# Patient Record
Sex: FEMALE | Race: WHITE | Employment: FULL TIME | URBAN - METROPOLITAN AREA
[De-identification: names, ages, dates, MRNs, and addresses within clinical notes are randomized per-mention and may not be internally consistent; named-entity substitution may affect disease eponyms.]

---

## 2019-02-26 ENCOUNTER — ANESTHESIA EVENT (OUTPATIENT)
Dept: OPERATING ROOM | Age: 49
End: 2019-02-26
Payer: COMMERCIAL

## 2019-02-26 ENCOUNTER — ANESTHESIA (OUTPATIENT)
Dept: OPERATING ROOM | Age: 49
End: 2019-02-26
Payer: COMMERCIAL

## 2019-02-26 ENCOUNTER — HOSPITAL ENCOUNTER (OUTPATIENT)
Age: 49
Setting detail: OUTPATIENT SURGERY
Discharge: HOME | End: 2019-02-26
Attending: SURGERY | Admitting: SURGERY
Payer: COMMERCIAL

## 2019-02-26 VITALS — OXYGEN SATURATION: 99 % | DIASTOLIC BLOOD PRESSURE: 74 MMHG | SYSTOLIC BLOOD PRESSURE: 127 MMHG

## 2019-02-26 VITALS
OXYGEN SATURATION: 100 % | TEMPERATURE: 98.2 F | HEIGHT: 68 IN | WEIGHT: 180 LBS | SYSTOLIC BLOOD PRESSURE: 146 MMHG | HEART RATE: 70 BPM | RESPIRATION RATE: 16 BRPM | BODY MASS INDEX: 27.28 KG/M2 | DIASTOLIC BLOOD PRESSURE: 78 MMHG

## 2019-02-26 LAB
-: NORMAL
HCG, PREGNANCY URINE (POC): NEGATIVE

## 2019-02-26 PROCEDURE — 7100000000 HC PACU RECOVERY - FIRST 15 MIN: Performed by: SURGERY

## 2019-02-26 PROCEDURE — 6370000000 HC RX 637 (ALT 250 FOR IP): Performed by: SURGERY

## 2019-02-26 PROCEDURE — 2580000003 HC RX 258: Performed by: SURGERY

## 2019-02-26 PROCEDURE — 3700000001 HC ADD 15 MINUTES (ANESTHESIA): Performed by: SURGERY

## 2019-02-26 PROCEDURE — 6360000002 HC RX W HCPCS: Performed by: ANESTHESIOLOGY

## 2019-02-26 PROCEDURE — 7100000031 HC ASPR PHASE II RECOVERY - ADDTL 15 MIN: Performed by: SURGERY

## 2019-02-26 PROCEDURE — 2500000003 HC RX 250 WO HCPCS: Performed by: ANESTHESIOLOGY

## 2019-02-26 PROCEDURE — 2709999900 HC NON-CHARGEABLE SUPPLY: Performed by: SURGERY

## 2019-02-26 PROCEDURE — 3609012400 HC EGD TRANSORAL BIOPSY SINGLE/MULTIPLE: Performed by: SURGERY

## 2019-02-26 PROCEDURE — 88305 TISSUE EXAM BY PATHOLOGIST: CPT

## 2019-02-26 PROCEDURE — 7100000030 HC ASPR PHASE II RECOVERY - FIRST 15 MIN: Performed by: SURGERY

## 2019-02-26 PROCEDURE — 84703 CHORIONIC GONADOTROPIN ASSAY: CPT

## 2019-02-26 PROCEDURE — 7100000001 HC PACU RECOVERY - ADDTL 15 MIN: Performed by: SURGERY

## 2019-02-26 PROCEDURE — 3700000000 HC ANESTHESIA ATTENDED CARE: Performed by: SURGERY

## 2019-02-26 RX ORDER — MIDAZOLAM HYDROCHLORIDE 1 MG/ML
INJECTION INTRAMUSCULAR; INTRAVENOUS PRN
Status: DISCONTINUED | OUTPATIENT
Start: 2019-02-26 | End: 2019-02-26 | Stop reason: SDUPTHER

## 2019-02-26 RX ORDER — SODIUM CHLORIDE, SODIUM LACTATE, POTASSIUM CHLORIDE, CALCIUM CHLORIDE 600; 310; 30; 20 MG/100ML; MG/100ML; MG/100ML; MG/100ML
INJECTION, SOLUTION INTRAVENOUS CONTINUOUS
Status: DISCONTINUED | OUTPATIENT
Start: 2019-02-26 | End: 2019-02-26 | Stop reason: HOSPADM

## 2019-02-26 RX ORDER — LIDOCAINE HYDROCHLORIDE 10 MG/ML
INJECTION, SOLUTION EPIDURAL; INFILTRATION; INTRACAUDAL; PERINEURAL PRN
Status: DISCONTINUED | OUTPATIENT
Start: 2019-02-26 | End: 2019-02-26 | Stop reason: SDUPTHER

## 2019-02-26 RX ORDER — FENTANYL CITRATE 50 UG/ML
25 INJECTION, SOLUTION INTRAMUSCULAR; INTRAVENOUS EVERY 5 MIN PRN
Status: DISCONTINUED | OUTPATIENT
Start: 2019-02-26 | End: 2019-02-26 | Stop reason: HOSPADM

## 2019-02-26 RX ORDER — PROPOFOL 10 MG/ML
INJECTION, EMULSION INTRAVENOUS PRN
Status: DISCONTINUED | OUTPATIENT
Start: 2019-02-26 | End: 2019-02-26 | Stop reason: SDUPTHER

## 2019-02-26 RX ORDER — OXYCODONE HYDROCHLORIDE AND ACETAMINOPHEN 5; 325 MG/1; MG/1
1 TABLET ORAL PRN
Status: DISCONTINUED | OUTPATIENT
Start: 2019-02-26 | End: 2019-02-26 | Stop reason: HOSPADM

## 2019-02-26 RX ORDER — MORPHINE SULFATE 2 MG/ML
1 INJECTION, SOLUTION INTRAMUSCULAR; INTRAVENOUS EVERY 5 MIN PRN
Status: DISCONTINUED | OUTPATIENT
Start: 2019-02-26 | End: 2019-02-26 | Stop reason: HOSPADM

## 2019-02-26 RX ORDER — DIPHENHYDRAMINE HYDROCHLORIDE 50 MG/ML
12.5 INJECTION INTRAMUSCULAR; INTRAVENOUS
Status: DISCONTINUED | OUTPATIENT
Start: 2019-02-26 | End: 2019-02-26 | Stop reason: HOSPADM

## 2019-02-26 RX ORDER — ONDANSETRON 2 MG/ML
4 INJECTION INTRAMUSCULAR; INTRAVENOUS
Status: DISCONTINUED | OUTPATIENT
Start: 2019-02-26 | End: 2019-02-26 | Stop reason: HOSPADM

## 2019-02-26 RX ORDER — MEPERIDINE HYDROCHLORIDE 50 MG/ML
12.5 INJECTION INTRAMUSCULAR; INTRAVENOUS; SUBCUTANEOUS EVERY 5 MIN PRN
Status: DISCONTINUED | OUTPATIENT
Start: 2019-02-26 | End: 2019-02-26 | Stop reason: HOSPADM

## 2019-02-26 RX ORDER — OXYCODONE HYDROCHLORIDE AND ACETAMINOPHEN 5; 325 MG/1; MG/1
2 TABLET ORAL PRN
Status: DISCONTINUED | OUTPATIENT
Start: 2019-02-26 | End: 2019-02-26 | Stop reason: HOSPADM

## 2019-02-26 RX ADMIN — PROPOFOL 30 MG: 10 INJECTION, EMULSION INTRAVENOUS at 13:20

## 2019-02-26 RX ADMIN — PROPOFOL 20 MG: 10 INJECTION, EMULSION INTRAVENOUS at 13:22

## 2019-02-26 RX ADMIN — MIDAZOLAM 2 MG: 1 INJECTION INTRAMUSCULAR; INTRAVENOUS at 13:12

## 2019-02-26 RX ADMIN — SODIUM CHLORIDE, POTASSIUM CHLORIDE, SODIUM LACTATE AND CALCIUM CHLORIDE: 600; 310; 30; 20 INJECTION, SOLUTION INTRAVENOUS at 12:12

## 2019-02-26 RX ADMIN — PROPOFOL 20 MG: 10 INJECTION, EMULSION INTRAVENOUS at 13:23

## 2019-02-26 RX ADMIN — PROPOFOL 40 MG: 10 INJECTION, EMULSION INTRAVENOUS at 13:21

## 2019-02-26 RX ADMIN — PROPOFOL 20 MG: 10 INJECTION, EMULSION INTRAVENOUS at 13:29

## 2019-02-26 RX ADMIN — PROPOFOL 20 MG: 10 INJECTION, EMULSION INTRAVENOUS at 13:26

## 2019-02-26 RX ADMIN — PROPOFOL 20 MG: 10 INJECTION, EMULSION INTRAVENOUS at 13:28

## 2019-02-26 RX ADMIN — PROPOFOL 50 MG: 10 INJECTION, EMULSION INTRAVENOUS at 13:15

## 2019-02-26 RX ADMIN — PROPOFOL 20 MG: 10 INJECTION, EMULSION INTRAVENOUS at 13:30

## 2019-02-26 RX ADMIN — PROPOFOL 20 MG: 10 INJECTION, EMULSION INTRAVENOUS at 13:27

## 2019-02-26 RX ADMIN — LIDOCAINE HYDROCHLORIDE 15 ML: 20 SOLUTION ORAL; TOPICAL at 12:59

## 2019-02-26 RX ADMIN — PROPOFOL 20 MG: 10 INJECTION, EMULSION INTRAVENOUS at 13:24

## 2019-02-26 RX ADMIN — PROPOFOL 20 MG: 10 INJECTION, EMULSION INTRAVENOUS at 13:25

## 2019-02-26 RX ADMIN — LIDOCAINE HYDROCHLORIDE 50 MG: 10 INJECTION, SOLUTION EPIDURAL; INFILTRATION; INTRACAUDAL; PERINEURAL at 13:15

## 2019-02-26 RX ADMIN — PROPOFOL 20 MG: 10 INJECTION, EMULSION INTRAVENOUS at 13:18

## 2019-02-26 ASSESSMENT — PULMONARY FUNCTION TESTS
PIF_VALUE: 1
PIF_VALUE: 0
PIF_VALUE: 0
PIF_VALUE: 1
PIF_VALUE: 0
PIF_VALUE: 1

## 2019-02-26 ASSESSMENT — PAIN SCALES - GENERAL
PAINLEVEL_OUTOF10: 0

## 2019-02-26 ASSESSMENT — PAIN - FUNCTIONAL ASSESSMENT: PAIN_FUNCTIONAL_ASSESSMENT: 0-10

## 2019-02-28 LAB — SURGICAL PATHOLOGY REPORT: NORMAL

## 2019-03-21 ENCOUNTER — HOSPITAL ENCOUNTER (OUTPATIENT)
Dept: NUCLEAR MEDICINE | Age: 49
Discharge: HOME | End: 2019-03-23
Payer: COMMERCIAL

## 2019-03-21 DIAGNOSIS — K21.9 GASTROESOPHAGEAL REFLUX DISEASE, ESOPHAGITIS PRESENCE NOT SPECIFIED: ICD-10-CM

## 2019-03-21 PROCEDURE — A9541 TC99M SULFUR COLLOID: HCPCS | Performed by: SURGERY

## 2019-03-21 PROCEDURE — 3430000000 HC RX DIAGNOSTIC RADIOPHARMACEUTICAL: Performed by: SURGERY

## 2019-03-21 PROCEDURE — 78264 GASTRIC EMPTYING IMG STUDY: CPT

## 2019-03-21 RX ADMIN — Medication 1.26 MILLICURIE: at 09:13

## 2019-03-22 ENCOUNTER — HOSPITAL ENCOUNTER (OUTPATIENT)
Dept: NUCLEAR MEDICINE | Age: 49
Discharge: HOME | End: 2019-03-24
Payer: COMMERCIAL

## 2019-03-22 DIAGNOSIS — K21.9 GASTROESOPHAGEAL REFLUX DISEASE, ESOPHAGITIS PRESENCE NOT SPECIFIED: ICD-10-CM

## 2019-03-22 PROCEDURE — A9541 TC99M SULFUR COLLOID: HCPCS | Performed by: SURGERY

## 2019-03-22 PROCEDURE — 78264 GASTRIC EMPTYING IMG STUDY: CPT

## 2019-03-22 PROCEDURE — 3430000000 HC RX DIAGNOSTIC RADIOPHARMACEUTICAL: Performed by: SURGERY

## 2019-03-22 RX ADMIN — Medication 1 MILLICURIE: at 10:07

## 2019-04-19 ENCOUNTER — HOSPITAL ENCOUNTER (OUTPATIENT)
Dept: GENERAL RADIOLOGY | Age: 49
Discharge: HOME | End: 2019-04-21
Payer: COMMERCIAL

## 2019-04-19 DIAGNOSIS — K21.9 GASTROESOPHAGEAL REFLUX DISEASE, ESOPHAGITIS PRESENCE NOT SPECIFIED: ICD-10-CM

## 2019-04-19 PROCEDURE — 74247 FL UGI W AIR CONTRAST: CPT

## 2019-04-19 PROCEDURE — 2500000003 HC RX 250 WO HCPCS: Performed by: SURGERY

## 2019-04-19 RX ADMIN — BARIUM SULFATE 140 ML: 980 POWDER, FOR SUSPENSION ORAL at 08:24

## 2019-04-19 RX ADMIN — BARIUM SULFATE 160 ML: 960 POWDER, FOR SUSPENSION ORAL at 08:23

## 2022-01-25 PROBLEM — I10 HYPERTENSION: Status: ACTIVE | Noted: 2019-03-28

## 2022-01-25 PROBLEM — K21.9 GERD (GASTROESOPHAGEAL REFLUX DISEASE): Status: ACTIVE | Noted: 2019-03-28

## 2024-07-01 ENCOUNTER — OFFICE VISIT (OUTPATIENT)
Dept: PODIATRY | Age: 54
End: 2024-07-01
Payer: COMMERCIAL

## 2024-07-01 VITALS — HEIGHT: 68 IN | WEIGHT: 180 LBS | BODY MASS INDEX: 27.28 KG/M2

## 2024-07-01 DIAGNOSIS — M84.375A STRESS FRACTURE OF LEFT CALCANEUS: Primary | ICD-10-CM

## 2024-07-01 DIAGNOSIS — M79.672 LEFT FOOT PAIN: ICD-10-CM

## 2024-07-01 PROCEDURE — 99204 OFFICE O/P NEW MOD 45 MIN: CPT | Performed by: PODIATRIST

## 2024-07-01 RX ORDER — ALBUTEROL SULFATE 90 UG/1
2 AEROSOL, METERED RESPIRATORY (INHALATION) EVERY 6 HOURS
COMMUNITY

## 2024-07-01 RX ORDER — AMLODIPINE BESYLATE AND BENAZEPRIL HYDROCHLORIDE 5; 20 MG/1; MG/1
1 CAPSULE ORAL DAILY
COMMUNITY
Start: 2024-05-13 | End: 2025-05-13

## 2024-07-01 NOTE — PROGRESS NOTES
[facility] PODIATRY  [address] [place], [place] [address]  Dept: [phone]    NEW PATIENT PROGRESS NOTE  Date of patient's visit: 7/1/2024  Patient's Name:  Tessa Ball YOB: 1970            Patient Care Team:  Jermaine Jensen MD as PCP - General (Family Medicine)        Chief Complaint   Patient presents with    New Patient     Establish care    Foot Pain     Left foot x 8 months         HPI:   Tessa Ball is a 54 y.o. female who presents to the office today complaining of left foot pain.  Symptoms began 8 month(s) ago. Patient relates pain is Present.  Pain is rated 2 out of 10 and is described as intermittent.  Treatments prior to today's visit include: physical therapy, over the counter inserts.  Currently denies F/C/N/V. Pt's primary care physician is Jermaine Jensen MD last seen may 13 2024     Allergies   Allergen Reactions    Minocycline      Other Reaction(s): bruising    Sulfa Antibiotics Hives       Past Medical History:   Diagnosis Date    Hypertension     Mitral valve prolapse        Prior to Admission medications    Medication Sig Start Date End Date Taking? Authorizing Provider   albuterol sulfate HFA (PROVENTIL;VENTOLIN;PROAIR) 108 (90 Base) MCG/ACT inhaler Inhale 2 puffs into the lungs every 6 hours   Yes ProviderChandu MD   amLODIPine-benazepril (LOTREL) 5-20 MG per capsule Take 1 capsule by mouth daily 5/13/24 5/13/25 Yes ProviderChandu MD   Multiple Vitamin (MULTI-VITAMIN) TABS Take 1 tablet by mouth every morning   Yes Chnadu Denis MD   metoprolol succinate (TOPROL XL) 50 MG extended release tablet  11/25/21  Yes Chandu Denis MD   raNITIdine (ZANTAC) 150 MG capsule 1 capsule   Yes Chandu Denis MD   citalopram (CELEXA) 10 MG tablet  11/12/21   Chandu Denis MD   predniSONE (DELTASONE) 10 MG tablet  11/12/21   Chandu Denis MD   TOPROL  MG XL

## 2024-07-12 ENCOUNTER — HOSPITAL ENCOUNTER (OUTPATIENT)
Dept: MRI IMAGING | Age: 54
Discharge: HOME | End: 2024-07-12
Attending: PODIATRIST
Payer: COMMERCIAL

## 2024-07-12 DIAGNOSIS — M79.672 LEFT FOOT PAIN: ICD-10-CM

## 2024-07-12 DIAGNOSIS — M84.375A STRESS FRACTURE OF LEFT CALCANEUS: ICD-10-CM

## 2024-07-12 PROCEDURE — 73721 MRI JNT OF LWR EXTRE W/O DYE: CPT

## 2024-07-17 ENCOUNTER — OFFICE VISIT (OUTPATIENT)
Dept: PODIATRY | Age: 54
End: 2024-07-17
Payer: COMMERCIAL

## 2024-07-17 VITALS — HEIGHT: 68 IN | BODY MASS INDEX: 27.28 KG/M2 | WEIGHT: 180 LBS

## 2024-07-17 DIAGNOSIS — M72.2 PLANTAR FASCIITIS OF LEFT FOOT: ICD-10-CM

## 2024-07-17 DIAGNOSIS — M79.672 LEFT FOOT PAIN: Primary | ICD-10-CM

## 2024-07-17 PROCEDURE — 99214 OFFICE O/P EST MOD 30 MIN: CPT | Performed by: PODIATRIST

## 2024-07-17 PROCEDURE — G8419 CALC BMI OUT NRM PARAM NOF/U: HCPCS | Performed by: PODIATRIST

## 2024-07-17 PROCEDURE — 3017F COLORECTAL CA SCREEN DOC REV: CPT | Performed by: PODIATRIST

## 2024-07-17 PROCEDURE — G8427 DOCREV CUR MEDS BY ELIG CLIN: HCPCS | Performed by: PODIATRIST

## 2024-07-17 PROCEDURE — 1036F TOBACCO NON-USER: CPT | Performed by: PODIATRIST

## 2024-07-17 NOTE — PROGRESS NOTES
Bilateral.  negative Tinel's, Bilateral.  negative Valleix sign, Bilateral.      Integument: Warm, dry, supple, Bilateral.  Open lesion absent, Bilateral.  Interdigital maceration absent to web spaces 1-4, Bilateral.  Nails are normal in length, thickness and color 1-5 bilateral.  Fissures absent, Bilateral.     MRI left ankle:  IMPRESSION:     1. Severe plantar fasciitis with tearing at the origin of the central cord   plantar fascia and reactive marrow edema at the plantar calcaneus.  No   calcaneal stress fracture.   2. Split tearing and moderate tendinosis of peroneus brevis tendon.  Moderate   peroneal tenosynovitis.   3. Complete ATFL tear likely remote.   4. Mild degenerative changes as detailed above.   5. Small posterior subtalar effusion.   6. Mild edema in the subcutaneous fat about the left ankle.  No organized   fluid collection.       Asessment: Patient is a 54 y.o. female with:    Diagnosis Orders   1. Left foot pain        2. Plantar fasciitis of left foot                    Plan: Patient examined and evaluated.  Current condition and treatment options discussed in detail.   Advised pt to  to her conditon and reviewed the MRI with the patient personally .  Verbal and written instructions given to patient. Contact office with any questions/problems/concerns.     No orders of the defined types were placed in this encounter.    No orders of the defined types were placed in this encounter.    All labs were reviewed and all imagining including the above findings were reviewed prior to the patients arrival and with the patient today      Time was spent educating the patient and their families/caregivers on proper care of the feet and ankles.  All the above diagnosis were addressed at todays visit and all questions were answered.      I had a long discussion today with the patient about the likely diagnosis and its natural history, physical exam and imaging findings, as well as treatment options. We

## 2024-07-19 ENCOUNTER — TELEPHONE (OUTPATIENT)
Dept: PODIATRY | Age: 54
End: 2024-07-19

## 2024-07-29 DIAGNOSIS — Z98.890 POST-OPERATIVE STATE: Primary | ICD-10-CM

## 2024-08-06 ENCOUNTER — TELEPHONE (OUTPATIENT)
Dept: PODIATRY | Age: 54
End: 2024-08-06

## 2024-08-06 NOTE — TELEPHONE ENCOUNTER
Spoke to patient to confirm SX: at the [facility] on 9/17/2024 at 12:45 pm and to arrive at 10:45 am. Phone call P.A.T 3-7 days prior to surgery

## 2024-09-10 ENCOUNTER — HOSPITAL ENCOUNTER (OUTPATIENT)
Age: 54
Discharge: HOME | End: 2024-09-10
Payer: COMMERCIAL

## 2024-09-10 PROCEDURE — 93005 ELECTROCARDIOGRAM TRACING: CPT | Performed by: STUDENT IN AN ORGANIZED HEALTH CARE EDUCATION/TRAINING PROGRAM

## 2024-09-11 ENCOUNTER — ANESTHESIA EVENT (OUTPATIENT)
Dept: OPERATING ROOM | Age: 54
End: 2024-09-11
Payer: COMMERCIAL

## 2024-09-13 LAB
EKG ATRIAL RATE: 81 BPM
EKG P AXIS: 46 DEGREES
EKG P-R INTERVAL: 148 MS
EKG Q-T INTERVAL: 380 MS
EKG QRS DURATION: 90 MS
EKG QTC CALCULATION (BAZETT): 441 MS
EKG R AXIS: 48 DEGREES
EKG T AXIS: 17 DEGREES
EKG VENTRICULAR RATE: 81 BPM

## 2024-09-17 ENCOUNTER — ANESTHESIA (OUTPATIENT)
Dept: OPERATING ROOM | Age: 54
End: 2024-09-17
Payer: COMMERCIAL

## 2024-09-17 ENCOUNTER — HOSPITAL ENCOUNTER (OUTPATIENT)
Age: 54
Setting detail: OUTPATIENT SURGERY
Discharge: HOME | End: 2024-09-17
Attending: PODIATRIST | Admitting: PODIATRIST
Payer: COMMERCIAL

## 2024-09-17 VITALS
DIASTOLIC BLOOD PRESSURE: 94 MMHG | RESPIRATION RATE: 12 BRPM | TEMPERATURE: 97.8 F | HEART RATE: 83 BPM | WEIGHT: 193 LBS | HEIGHT: 67 IN | OXYGEN SATURATION: 98 % | SYSTOLIC BLOOD PRESSURE: 120 MMHG | BODY MASS INDEX: 30.29 KG/M2

## 2024-09-17 DIAGNOSIS — G89.18 POST-OP PAIN: ICD-10-CM

## 2024-09-17 DIAGNOSIS — Z01.818 PRE-OP TESTING: Primary | ICD-10-CM

## 2024-09-17 LAB — HCG, PREGNANCY URINE (POC): NEGATIVE

## 2024-09-17 PROCEDURE — 3600000012 HC SURGERY LEVEL 2 ADDTL 15MIN: Performed by: PODIATRIST

## 2024-09-17 PROCEDURE — 6360000002 HC RX W HCPCS

## 2024-09-17 PROCEDURE — 2720000010 HC SURG SUPPLY STERILE: Performed by: PODIATRIST

## 2024-09-17 PROCEDURE — 7100000000 HC PACU RECOVERY - FIRST 15 MIN: Performed by: PODIATRIST

## 2024-09-17 PROCEDURE — 2709999900 HC NON-CHARGEABLE SUPPLY: Performed by: PODIATRIST

## 2024-09-17 PROCEDURE — 3700000000 HC ANESTHESIA ATTENDED CARE: Performed by: PODIATRIST

## 2024-09-17 PROCEDURE — 2580000003 HC RX 258: Performed by: ANESTHESIOLOGY

## 2024-09-17 PROCEDURE — 7100000010 HC PHASE II RECOVERY - FIRST 15 MIN: Performed by: PODIATRIST

## 2024-09-17 PROCEDURE — 81025 URINE PREGNANCY TEST: CPT

## 2024-09-17 PROCEDURE — 3600000002 HC SURGERY LEVEL 2 BASE: Performed by: PODIATRIST

## 2024-09-17 PROCEDURE — 2500000003 HC RX 250 WO HCPCS: Performed by: PODIATRIST

## 2024-09-17 PROCEDURE — 6370000000 HC RX 637 (ALT 250 FOR IP)

## 2024-09-17 PROCEDURE — 7100000011 HC PHASE II RECOVERY - ADDTL 15 MIN: Performed by: PODIATRIST

## 2024-09-17 PROCEDURE — 3700000001 HC ADD 15 MINUTES (ANESTHESIA): Performed by: PODIATRIST

## 2024-09-17 PROCEDURE — 7100000001 HC PACU RECOVERY - ADDTL 15 MIN: Performed by: PODIATRIST

## 2024-09-17 RX ORDER — SODIUM CHLORIDE, SODIUM LACTATE, POTASSIUM CHLORIDE, CALCIUM CHLORIDE 600; 310; 30; 20 MG/100ML; MG/100ML; MG/100ML; MG/100ML
INJECTION, SOLUTION INTRAVENOUS CONTINUOUS
Status: DISCONTINUED | OUTPATIENT
Start: 2024-09-17 | End: 2024-09-17 | Stop reason: HOSPADM

## 2024-09-17 RX ORDER — MORPHINE SULFATE 2 MG/ML
1 INJECTION, SOLUTION INTRAMUSCULAR; INTRAVENOUS EVERY 5 MIN PRN
Status: DISCONTINUED | OUTPATIENT
Start: 2024-09-17 | End: 2024-09-17 | Stop reason: HOSPADM

## 2024-09-17 RX ORDER — DIPHENHYDRAMINE HYDROCHLORIDE 50 MG/ML
12.5 INJECTION INTRAMUSCULAR; INTRAVENOUS
Status: DISCONTINUED | OUTPATIENT
Start: 2024-09-17 | End: 2024-09-17 | Stop reason: HOSPADM

## 2024-09-17 RX ORDER — DEXAMETHASONE SODIUM PHOSPHATE 10 MG/ML
INJECTION, SOLUTION INTRAMUSCULAR; INTRAVENOUS
Status: DISCONTINUED | OUTPATIENT
Start: 2024-09-17 | End: 2024-09-17 | Stop reason: SDUPTHER

## 2024-09-17 RX ORDER — ONDANSETRON 2 MG/ML
4 INJECTION INTRAMUSCULAR; INTRAVENOUS
Status: DISCONTINUED | OUTPATIENT
Start: 2024-09-17 | End: 2024-09-17 | Stop reason: HOSPADM

## 2024-09-17 RX ORDER — SODIUM CHLORIDE 0.9 % (FLUSH) 0.9 %
5-40 SYRINGE (ML) INJECTION PRN
Status: DISCONTINUED | OUTPATIENT
Start: 2024-09-17 | End: 2024-09-17 | Stop reason: HOSPADM

## 2024-09-17 RX ORDER — ONDANSETRON 2 MG/ML
INJECTION INTRAMUSCULAR; INTRAVENOUS
Status: DISCONTINUED | OUTPATIENT
Start: 2024-09-17 | End: 2024-09-17 | Stop reason: SDUPTHER

## 2024-09-17 RX ORDER — PROPOFOL 10 MG/ML
INJECTION, EMULSION INTRAVENOUS
Status: DISCONTINUED | OUTPATIENT
Start: 2024-09-17 | End: 2024-09-17 | Stop reason: SDUPTHER

## 2024-09-17 RX ORDER — LIDOCAINE HYDROCHLORIDE 10 MG/ML
INJECTION, SOLUTION EPIDURAL; INFILTRATION; INTRACAUDAL; PERINEURAL PRN
Status: DISCONTINUED | OUTPATIENT
Start: 2024-09-17 | End: 2024-09-17 | Stop reason: ALTCHOICE

## 2024-09-17 RX ORDER — OXYCODONE HYDROCHLORIDE 5 MG/1
10 TABLET ORAL PRN
Status: DISCONTINUED | OUTPATIENT
Start: 2024-09-17 | End: 2024-09-17 | Stop reason: HOSPADM

## 2024-09-17 RX ORDER — LIDOCAINE HYDROCHLORIDE 10 MG/ML
INJECTION, SOLUTION INFILTRATION; PERINEURAL
Status: COMPLETED
Start: 2024-09-17 | End: 2024-09-17

## 2024-09-17 RX ORDER — LABETALOL HYDROCHLORIDE 5 MG/ML
10 INJECTION, SOLUTION INTRAVENOUS
Status: DISCONTINUED | OUTPATIENT
Start: 2024-09-17 | End: 2024-09-17 | Stop reason: HOSPADM

## 2024-09-17 RX ORDER — SODIUM CHLORIDE 9 MG/ML
INJECTION, SOLUTION INTRAVENOUS PRN
Status: DISCONTINUED | OUTPATIENT
Start: 2024-09-17 | End: 2024-09-17 | Stop reason: HOSPADM

## 2024-09-17 RX ORDER — SODIUM CHLORIDE 0.9 % (FLUSH) 0.9 %
5-40 SYRINGE (ML) INJECTION EVERY 12 HOURS SCHEDULED
Status: DISCONTINUED | OUTPATIENT
Start: 2024-09-17 | End: 2024-09-17 | Stop reason: HOSPADM

## 2024-09-17 RX ORDER — MIDAZOLAM HYDROCHLORIDE 2 MG/2ML
2 INJECTION, SOLUTION INTRAMUSCULAR; INTRAVENOUS
Status: DISCONTINUED | OUTPATIENT
Start: 2024-09-17 | End: 2024-09-17 | Stop reason: HOSPADM

## 2024-09-17 RX ORDER — HYDROCODONE BITARTRATE AND ACETAMINOPHEN 5; 325 MG/1; MG/1
TABLET ORAL
Status: COMPLETED
Start: 2024-09-17 | End: 2024-09-17

## 2024-09-17 RX ORDER — OXYCODONE HYDROCHLORIDE 5 MG/1
5 TABLET ORAL PRN
Status: DISCONTINUED | OUTPATIENT
Start: 2024-09-17 | End: 2024-09-17 | Stop reason: HOSPADM

## 2024-09-17 RX ORDER — HYDROCODONE BITARTRATE AND ACETAMINOPHEN 5; 325 MG/1; MG/1
1 TABLET ORAL EVERY 6 HOURS PRN
Qty: 20 TABLET | Refills: 0 | Status: SHIPPED | OUTPATIENT
Start: 2024-09-17 | End: 2024-09-22

## 2024-09-17 RX ORDER — FENTANYL CITRATE 50 UG/ML
INJECTION, SOLUTION INTRAMUSCULAR; INTRAVENOUS
Status: DISCONTINUED | OUTPATIENT
Start: 2024-09-17 | End: 2024-09-17 | Stop reason: SDUPTHER

## 2024-09-17 RX ORDER — HYDROCODONE BITARTRATE AND ACETAMINOPHEN 5; 325 MG/1; MG/1
1 TABLET ORAL ONCE
Status: COMPLETED | OUTPATIENT
Start: 2024-09-17 | End: 2024-09-17

## 2024-09-17 RX ORDER — HYDRALAZINE HYDROCHLORIDE 20 MG/ML
10 INJECTION INTRAMUSCULAR; INTRAVENOUS
Status: DISCONTINUED | OUTPATIENT
Start: 2024-09-17 | End: 2024-09-17 | Stop reason: HOSPADM

## 2024-09-17 RX ORDER — METOCLOPRAMIDE HYDROCHLORIDE 5 MG/ML
10 INJECTION INTRAMUSCULAR; INTRAVENOUS
Status: DISCONTINUED | OUTPATIENT
Start: 2024-09-17 | End: 2024-09-17 | Stop reason: HOSPADM

## 2024-09-17 RX ORDER — SODIUM CHLORIDE 9 MG/ML
INJECTION, SOLUTION INTRAVENOUS CONTINUOUS
Status: DISCONTINUED | OUTPATIENT
Start: 2024-09-17 | End: 2024-09-17 | Stop reason: HOSPADM

## 2024-09-17 RX ORDER — NALOXONE HYDROCHLORIDE 0.4 MG/ML
INJECTION, SOLUTION INTRAMUSCULAR; INTRAVENOUS; SUBCUTANEOUS PRN
Status: DISCONTINUED | OUTPATIENT
Start: 2024-09-17 | End: 2024-09-17 | Stop reason: HOSPADM

## 2024-09-17 RX ORDER — MEPERIDINE HYDROCHLORIDE 50 MG/ML
12.5 INJECTION INTRAMUSCULAR; INTRAVENOUS; SUBCUTANEOUS ONCE
Status: DISCONTINUED | OUTPATIENT
Start: 2024-09-17 | End: 2024-09-17 | Stop reason: HOSPADM

## 2024-09-17 RX ADMIN — DEXAMETHASONE SODIUM PHOSPHATE 10 MG: 10 INJECTION INTRAMUSCULAR; INTRAVENOUS at 12:49

## 2024-09-17 RX ADMIN — Medication 0.5 MG: at 13:24

## 2024-09-17 RX ADMIN — FENTANYL CITRATE 100 MCG: 50 INJECTION, SOLUTION INTRAMUSCULAR; INTRAVENOUS at 12:45

## 2024-09-17 RX ADMIN — PROPOFOL 200 MG: 10 INJECTION, EMULSION INTRAVENOUS at 12:45

## 2024-09-17 RX ADMIN — HYDROCODONE BITARTRATE AND ACETAMINOPHEN 1 TABLET: 5; 325 TABLET ORAL at 13:48

## 2024-09-17 RX ADMIN — ONDANSETRON 4 MG: 2 INJECTION INTRAMUSCULAR; INTRAVENOUS at 13:12

## 2024-09-17 RX ADMIN — SODIUM CHLORIDE, POTASSIUM CHLORIDE, SODIUM LACTATE AND CALCIUM CHLORIDE: 600; 310; 30; 20 INJECTION, SOLUTION INTRAVENOUS at 11:45

## 2024-09-17 RX ADMIN — LIDOCAINE HYDROCHLORIDE 50 ML: 10 INJECTION, SOLUTION EPIDURAL; INFILTRATION; INTRACAUDAL; PERINEURAL at 12:45

## 2024-09-17 RX ADMIN — HYDROMORPHONE HYDROCHLORIDE 0.5 MG: 1 INJECTION, SOLUTION INTRAMUSCULAR; INTRAVENOUS; SUBCUTANEOUS at 13:24

## 2024-09-17 RX ADMIN — PROPOFOL 100 MG: 10 INJECTION, EMULSION INTRAVENOUS at 12:48

## 2024-09-17 ASSESSMENT — PAIN - FUNCTIONAL ASSESSMENT
PAIN_FUNCTIONAL_ASSESSMENT: PREVENTS OR INTERFERES WITH MANY ACTIVE NOT PASSIVE ACTIVITIES
PAIN_FUNCTIONAL_ASSESSMENT: 0-10

## 2024-09-17 ASSESSMENT — PAIN DESCRIPTION - ORIENTATION
ORIENTATION: LEFT

## 2024-09-17 ASSESSMENT — PAIN DESCRIPTION - DESCRIPTORS
DESCRIPTORS: ACHING;SORE
DESCRIPTORS: ACHING
DESCRIPTORS: SHARP
DESCRIPTORS: ACHING;SORE
DESCRIPTORS: ACHING;SORE;TENDER

## 2024-09-17 ASSESSMENT — PAIN SCALES - GENERAL
PAINLEVEL_OUTOF10: 2
PAINLEVEL_OUTOF10: 7
PAINLEVEL_OUTOF10: 5
PAINLEVEL_OUTOF10: 6
PAINLEVEL_OUTOF10: 6

## 2024-09-17 ASSESSMENT — ENCOUNTER SYMPTOMS
HEARTBURN: 0
BLURRED VISION: 0
COUGH: 0
SHORTNESS OF BREATH: 0
VOMITING: 0
NAUSEA: 0

## 2024-09-17 ASSESSMENT — PAIN DESCRIPTION - FREQUENCY: FREQUENCY: CONTINUOUS

## 2024-09-17 ASSESSMENT — PAIN DESCRIPTION - LOCATION
LOCATION: FOOT

## 2024-09-17 ASSESSMENT — PAIN DESCRIPTION - PAIN TYPE: TYPE: CHRONIC PAIN

## 2024-09-24 PROBLEM — M79.672 LEFT FOOT PAIN: Status: ACTIVE | Noted: 2024-09-24

## 2024-09-24 PROBLEM — M72.2 PLANTAR FASCIAL FIBROMATOSIS: Status: ACTIVE | Noted: 2024-09-24

## 2024-09-24 PROBLEM — Z01.818 PRE-OP TESTING: Status: ACTIVE | Noted: 2024-09-24

## 2024-10-02 ENCOUNTER — OFFICE VISIT (OUTPATIENT)
Dept: PODIATRY | Age: 54
End: 2024-10-02

## 2024-10-02 VITALS — BODY MASS INDEX: 30.29 KG/M2 | HEIGHT: 67 IN | WEIGHT: 193 LBS

## 2024-10-02 DIAGNOSIS — Z98.890 POST-OPERATIVE STATE: Primary | ICD-10-CM

## 2024-10-02 DIAGNOSIS — M79.672 LEFT FOOT PAIN: ICD-10-CM

## 2024-10-02 PROCEDURE — 99024 POSTOP FOLLOW-UP VISIT: CPT | Performed by: PODIATRIST

## 2024-10-07 NOTE — PROGRESS NOTES
[facility] PODIATRY  [address] [place], [place] [address]  Dept: [phone]    POST-OP PROGRESS NOTE  Date of patient's visit: 10/2/2024  Patient's Name:  Tessa Ball YOB: 1970            Patient Care Team:  Jermaine Jensen MD as PCP - General (Family Medicine)  Fercho Villafana DPM as Consulting Physician (Podiatry, Foot & Ankle Surgery)        Chief Complaint   Patient presents with    Post-Op Check       Subjective: Tessa Ball is a 54 y.o. female who presents to the office today 2week(s)  S/P  plantar fasciectomy with Hope Hull ablation and PRP injection for correction of   Problem List Items Addressed This Visit    None  . Patient relates pain is present and improved.  Pain is rated 2  out of 10 and is described as mild. Currently denies F/C/N/V.  Is patient taking pain medications as prescribed and is controlling pain    Physical Examination:  Incision is coapted, sutures/steri-strips are intact. Minimal bleeding post operatively. Edema present. No erythema. No Pus.   Operative correction is satisfactory.  Minimal pain on palpation    Pain on palpation with dorsiflexion of the lesser digits        Assessment: Tessa Ball is status post as above  Normal post operative course.  Doing well       Diagnosis Orders   1. Post-operative state        2. Left foot pain                Plan:  Patient examined and evaluated.  Current condition and treatment options discussed in detail.  Advised pt to come out of the cam boot is much as possible and massage the area.  Patient is to weight-bear in the cam boot for 1 week no use of the scooter anymore after 1 week as I do want patient to start weightbearing in his heel    .  Verbal and written instructions given to patient.  Orders: No orders of the defined types were placed in this encounter.     Contact office with any questions/problems/concerns.  RTC in 3week(s).

## 2024-10-14 ENCOUNTER — OFFICE VISIT (OUTPATIENT)
Dept: PODIATRY | Age: 54
End: 2024-10-14

## 2024-10-14 VITALS — HEIGHT: 67 IN | BODY MASS INDEX: 30.29 KG/M2 | WEIGHT: 193 LBS

## 2024-10-14 DIAGNOSIS — M79.672 LEFT FOOT PAIN: ICD-10-CM

## 2024-10-14 DIAGNOSIS — Z98.890 POST-OPERATIVE STATE: Primary | ICD-10-CM

## 2024-10-14 DIAGNOSIS — M72.2 PLANTAR FASCIITIS OF LEFT FOOT: ICD-10-CM

## 2024-10-14 PROCEDURE — 99024 POSTOP FOLLOW-UP VISIT: CPT | Performed by: PODIATRIST

## 2024-10-14 NOTE — PROGRESS NOTES
[facility] PODIATRY  [address] [place], [place] [address]  Dept: [phone]    POST-OP PROGRESS NOTE  Date of patient's visit: 10/14/2024  Patient's Name:  Tessa Ball YOB: 1970            Patient Care Team:  Jermaine Jensen MD as PCP - General (Family Medicine)  Fercho Villafana DPM as Consulting Physician (Podiatry, Foot & Ankle Surgery)        Chief Complaint   Patient presents with    Post-Op Check     Post op x 4 weeks       Pt's primary care physician is Jermaine Jensen MD last seen may 13 2024     Subjective: Tessa Ball is a 54 y.o. female who presents to the office today 4week(s)  S/P LEFT FOOT PLANTAR FASCIECTOMY TOPAZ COBLATION AND PRP  for correction of plantar fasciitis left foot  Problem List Items Addressed This Visit    None  Visit Diagnoses       Post-operative state    -  Primary        . Patient relates pain is Absent  and IMPROVED.  Pain is rated 0 out of 10 and is described as none. Currently denies F/C/N/V.  Is patient taking pain medications as prescribed and is controlling pain yes tylenol    Physical Examination:  Incision is coapted, sutures/steri-strips are intact. Minimal bleeding post operatively. Edema present. No erythema. No Pus.   Operative correction is satisfactory.      Radiographs: xrays left foot 3 views;  no stress fracture seen.  No acute bony abnormlaities there does appear to be soft tissue edema to the plantar heel      Assessment: Tessa Ball is status post LEFT FOOT PLANTAR FASCIECTOMY TOPAZ COBLATION AND PRP   Normal post operative course.  Doing well          ICD-10-CM    1. Post-operative state  Z98.890             Plan:  Patient examined and evaluated.  Current condition and treatment options discussed in detail.  Advised pt to her x-ray findings of the left foot.  We will start physical therapy with Nancy at [place]    She has no weightbearing

## 2024-10-17 ENCOUNTER — HOSPITAL ENCOUNTER (OUTPATIENT)
Dept: PHYSICAL THERAPY | Facility: CLINIC | Age: 54
Setting detail: THERAPIES SERIES
Discharge: HOME | End: 2024-10-17
Attending: PODIATRIST
Payer: COMMERCIAL

## 2024-10-17 PROCEDURE — 97161 PT EVAL LOW COMPLEX 20 MIN: CPT

## 2024-10-17 PROCEDURE — 97016 VASOPNEUMATIC DEVICE THERAPY: CPT

## 2024-10-17 PROCEDURE — 97110 THERAPEUTIC EXERCISES: CPT

## 2024-10-17 NOTE — CONSULTS
[facility]  Outpatient Rehabilitation & Therapy  [address]  [place], [place] [address]  P: [phone]  F: [phone]     Physical Therapy Lower Extremity Evaluation    Date:  10/17/2024  Patient: Tessa Ball  : 1970  MRN: [id number]  Physician: Fercho Villafana DPM      Insurance: [facility]--  29 visits remaining no auth required  CPT codes verified:  83579, 80028, 00058, 42086, 84424, 50123, 60670  Excluded: DRY NEEDLING - ,   Medical Diagnosis: Z98.890 (ICD-10-CM) - Post-operative state  M72.2 (ICD-10-CM) - Plantar fasciitis of left foot  M79.672 (ICD-10-CM) - Left foot pain    Rehab Codes: R26.2 difficulty walking, M62.81 muscle weakness  Onset date: 24   Next Dr's appt.: 24    Subjective:   CC/HPI:   3 wks NWB Surgical shoe till 1st post op visit then wore a sandel/shoe WBAT  pain  2 heel spur sx  Plantar fasciatis for yrs and years of foot problems  Injections, splint  Years of bilateral foot pain  Corns removed    Procedure: 24  Percutaneous Radiofrequency ablation (Topaz) and plantar fasciectomy of plantar fascia, L foot (CPT 56843)  Injectable biologic, platelet rich plasma, L foot and ankle (CPT 0232T)    Location of pain Under big and little toe heel, medial ankle,    Pain rating currently    Pain rating  at worst    Pain rating at best    What makes it worse Walking and standing 7/10, sit to stand, barefoot sitting 0/10    What makes it better unsure   Description of pain Sharp dull aching that intensifies   Sleep Disturbed    Altered Sensation    Symptom progression    Work Activities/duties  Walking, standing, sitting   Recreational Activities    PLOF                PMHx: [] Unremarkable [] Diabetes [] HTN  [] Pacemaker   [] MI/Heart Problems [] Cancer [] Arthritis   [] Other:              [] Refer to full medical chart  In EPIC       Medications:  [x] Refer to full medical record [] None [] Other:  Allergies:       [x] Refer to full

## 2024-10-23 ENCOUNTER — HOSPITAL ENCOUNTER (OUTPATIENT)
Dept: PHYSICAL THERAPY | Facility: CLINIC | Age: 54
Setting detail: THERAPIES SERIES
Discharge: HOME | End: 2024-10-23
Attending: PODIATRIST
Payer: COMMERCIAL

## 2024-10-23 PROCEDURE — 97110 THERAPEUTIC EXERCISES: CPT

## 2024-10-23 PROCEDURE — 97140 MANUAL THERAPY 1/> REGIONS: CPT

## 2024-10-23 NOTE — FLOWSHEET NOTE
Next Dr's appt.: 11/11/24  Visit# / total visits: 2/20     Cancels/No Shows: 0/0    Subjective:    Pain:  [x] Yes  [] No Location: L foot Pain Rating: (0-10 scale) 4/10 WB only  Pain altered Tx:  [] No  [] Yes  Action:  Comments: Patient reports compliance with HEP. She states that she notices increased pain after exercises and icing.    Objective:  Modalities:   Precautions [] No  [x] Yes:   Procedure: 9/17/24  Percutaneous Radiofrequency ablation (Topaz) and plantar fasciectomy of plantar fascia, L foot (CPT 47590)  Injectable biologic, platelet rich plasma, L foot and ankle (CPT 0232T)      [x] MRI: 7/12/24              IMPRESSION:  1. Severe plantar fasciitis with tearing at the origin of the central cord  plantar fascia and reactive marrow edema at the plantar calcaneus.  No  calcaneal stress fracture.  2. Split tearing and moderate tendinosis of peroneus brevis tendon.  Moderate  peroneal tenosynovitis.  3. Complete ATFL tear likely remote.  4. Mild degenerative changes as detailed above.  5. Small posterior subtalar effusion.  6. Mild edema in the subcutaneous fat about the left ankle.  No organized  fluid collection.    Exercises:  Exercise Reps/ Time Weight/ Level Comments   TB toe press 20ea lime 1st, 2-4   Towel calf stretch 2x60\"     HEP review      Gait training   With SPC recommendations on how to carry items with crutch safety                                                                                                    Other: Manual: STR L gastroc and soleus, PROM rearfoot, midfoot and forefoot    Treatment Charges: Mins Units Time In/Out   []  Modalities        [x]  Ther Exercise 15 1    []  Neuromuscular Re-ed      []  Gait Training      [x]  Manual Therapy 30 2    []  Ther Activities      []  Aquatics      []  Vasocompression      []  Cervical Traction      []  Other      Total Billable time 45 min 3           Assessment: [x] Progressing toward goals.    [] No change.     [x]

## 2024-10-24 PROBLEM — Z01.818 PRE-OP TESTING: Status: RESOLVED | Noted: 2024-09-24 | Resolved: 2024-10-24

## 2024-10-25 ENCOUNTER — HOSPITAL ENCOUNTER (OUTPATIENT)
Dept: PHYSICAL THERAPY | Facility: CLINIC | Age: 54
Setting detail: THERAPIES SERIES
Discharge: HOME | End: 2024-10-25
Attending: PODIATRIST
Payer: COMMERCIAL

## 2024-10-25 PROCEDURE — 97140 MANUAL THERAPY 1/> REGIONS: CPT

## 2024-10-25 PROCEDURE — 97110 THERAPEUTIC EXERCISES: CPT

## 2024-10-25 NOTE — FLOWSHEET NOTE
[] [facility]  Outpatient Rehabilitation &  Therapy  [address]  [phone]  [phone] [] [facility]  Outpatient Rehabilitation &  Therapy  [address]  P: [phone]  F: [phone] [x] [facility]  Outpatient Rehabilitation &  Therapy  [address]  P: [phone]  F: [phone] [] [facility]  Outpatient Rehabilitation &  Therapy  [address]  [phone]  [phone] [] [facility]  Outpatient Rehabilitation &  Therapy  [address]   P: [phone]  F: [phone]  [] [facility]  Outpatient Rehabilitation &  Therapy  [address]  P: [phone]  F: [phone] [] [facility]  Outpatient Rehabilitation &  Therapy  [address]  P: [phone]  F: [phone] [] [facility]  Outpatient Rehabilitation &  Therapy  [address]  P: [phone]  F: [phone] [] [facility]  Outpatient Rehabilitation &  Therapy  [address]  P: [phone]  F: [phone]  [] [facility] Outpatient Rehabilitation &  Therapy  [address]  P: [phone]  F: [phone]     Physical Therapy Daily Treatment Note    Date:  10/25/2024  Patient Name:  Tessa Ball    :  1970  MRN: [id number]  Physician: Fercho Villafana DPM                                                Insurance: [employer]--  29 visits remaining no auth required  CPT codes verified:  34728, 35569, 54818, 77346, 07701, 75624, 99507  Excluded: DRY NEEDLING - ,   Medical Diagnosis: Z98.890 (ICD-10-CM) - Post-operative state  M72.2 (ICD-10-CM) - Plantar fasciitis of left foot  M79.672 (ICD-10-CM) - Left foot pain                          Rehab Codes: R26.2 difficulty walking, M62.81 muscle weakness, M25.572  Onset date: 24

## 2024-10-29 ENCOUNTER — HOSPITAL ENCOUNTER (OUTPATIENT)
Dept: PHYSICAL THERAPY | Facility: CLINIC | Age: 54
Setting detail: THERAPIES SERIES
Discharge: HOME | End: 2024-10-29
Attending: PODIATRIST
Payer: COMMERCIAL

## 2024-10-29 PROCEDURE — 97110 THERAPEUTIC EXERCISES: CPT

## 2024-10-29 PROCEDURE — 97140 MANUAL THERAPY 1/> REGIONS: CPT

## 2024-10-29 NOTE — FLOWSHEET NOTE
toward long and short term goals.          Time In:12:30pm            Time Out: 1:30pm    Electronically signed by:  Nancy Long PT

## 2024-11-01 ENCOUNTER — HOSPITAL ENCOUNTER (OUTPATIENT)
Dept: PHYSICAL THERAPY | Facility: CLINIC | Age: 54
Setting detail: THERAPIES SERIES
Discharge: HOME | End: 2024-11-01
Attending: PODIATRIST
Payer: COMMERCIAL

## 2024-11-01 PROCEDURE — 97110 THERAPEUTIC EXERCISES: CPT

## 2024-11-01 PROCEDURE — 97140 MANUAL THERAPY 1/> REGIONS: CPT

## 2024-11-01 NOTE — FLOWSHEET NOTE
Next 's appt.: 11/11/24  Visit# / total visits: 5/20     Cancels/No Shows: 0/0    Subjective:    Pain:  [x] Yes  [] No Location: L foot Pain Rating: (0-10 scale) 2/10 WB only  0/10 NWB  Pain altered Tx:  [] No  [] Yes  Action:  Comments: Patient reports continued improvement in reduced pain. She presents today without crutch and slight limp.    Objective:  Modalities:   Precautions [] No  [x] Yes:   Procedure: 9/17/24  Percutaneous Radiofrequency ablation (Topaz) and plantar fasciectomy of plantar fascia, L foot (CPT 92951)  Injectable biologic, platelet rich plasma, L foot and ankle (CPT 0232T)      [x] MRI: 7/12/24              IMPRESSION:  1. Severe plantar fasciitis with tearing at the origin of the central cord  plantar fascia and reactive marrow edema at the plantar calcaneus.  No  calcaneal stress fracture.  2. Split tearing and moderate tendinosis of peroneus brevis tendon.  Moderate  peroneal tenosynovitis.  3. Complete ATFL tear likely remote.  4. Mild degenerative changes as detailed above.  5. Small posterior subtalar effusion.  6. Mild edema in the subcutaneous fat about the left ankle.  No organized  fluid collection.    Exercises:  Exercise Reps/ Time Weight/ Level Comments    TB toe press 20ea blue 1st, 2-4    HEP    Seated heel raise x20      Posterior tib ISO 20x10\"      Posterior tib iso x15ea lime     Towel calf stretch 2x60\"   x   LAX rolling 90\"x2   x   LAX w/ big toe stretch 90\"   x   Gait training   Wt shifting    Calf contract relax 10   x   KB DF 20 5lbs  x   Peroneal B ISO 15 0  x   Migue lean 20   x   Lift splay reach 10  standing x   Modified tibial touch 10   x                               Reviewed HEP   Clarified technique x                                      Other: Manual: STR L gastroc and soleus    Access Code: [email or web address]  URL: [email or web address]  Date: 10/25/2024  Prepared by: Nancy Long    Exercises  - Lift Splay Reach  - 2 x daily - 7 x weekly

## 2024-11-04 ENCOUNTER — HOSPITAL ENCOUNTER (OUTPATIENT)
Dept: PHYSICAL THERAPY | Facility: CLINIC | Age: 54
Setting detail: THERAPIES SERIES
Discharge: HOME | End: 2024-11-04
Attending: PODIATRIST
Payer: COMMERCIAL

## 2024-11-04 PROCEDURE — 97140 MANUAL THERAPY 1/> REGIONS: CPT

## 2024-11-04 PROCEDURE — 97110 THERAPEUTIC EXERCISES: CPT

## 2024-11-06 ENCOUNTER — HOSPITAL ENCOUNTER (OUTPATIENT)
Dept: PHYSICAL THERAPY | Facility: CLINIC | Age: 54
Setting detail: THERAPIES SERIES
Discharge: HOME | End: 2024-11-06
Attending: PODIATRIST
Payer: COMMERCIAL

## 2024-11-06 PROCEDURE — 97140 MANUAL THERAPY 1/> REGIONS: CPT

## 2024-11-06 PROCEDURE — 97016 VASOPNEUMATIC DEVICE THERAPY: CPT

## 2024-11-06 PROCEDURE — 97110 THERAPEUTIC EXERCISES: CPT

## 2024-11-06 NOTE — FLOWSHEET NOTE
[email or web address]  URL: [email or web address]  Date: 11/04/2024  Prepared by: Nancy Long    Exercises  - Lift Splay Reach  - 2 x daily - 7 x weekly - 3 sets - 10 reps - 3sec hold  - Toe Yoga  - 2 x daily - 7 x weekly - 3 sets - 10 reps  - Seated Arch Lifts  - 2 x daily - 7 x weekly - 2 sets - 10 reps - 5sec hold  - Band Toe Press  - 2 x daily - 7 x weekly - 3 sets - 10 reps - 5sec hold  - Calf Stretch With Towel Under Foot  - 2-3 x daily - 7 x weekly - 1 sets - 10 reps - 60-90sec hold    Treatment Charges: Mins Units Time In/Out   []  Modalities        [x]  Ther Exercise 35 2    []  Neuromuscular Re-ed      []  Gait Training      [x]  Manual Therapy 15 1    []  Ther Activities      []  Aquatics      [x]  Vasocompression 15 1    []  Cervical Traction      []  Other      Total Billable time 65 min 4         Assessment: [x] Progressing toward goals.    [] No change.     [x] Other:Progressed intrinsic strengthening with added weight VC's for control and alignment. Added vaso at end of treatment due to  increasing edema    Goals  MET NOT MET ON-  GOING  Details               STG: To be met in 10 treatments            1. ? Pain: Decrease pain levels to 4/10 max pain with walking []  []  []      2. ? ROM: Patient to demonstrate -8 degrees L TCJ DF []  []  []      3. ? Strength: Patient to demonstrate the ability to ambulate without limping []  []  []      4. Independent with Home Exercise Programs []  []  []        []  []  []        []  []  []                  LTG: To be met in 20 treatments           1. Patient to report 0/10 L foot pain with standing and walking []  []  []      2. Patient to demonstrate 0 degrees L TCJ DF []  []  []      3. Patient to report 70/80 on FAAM []  []  []      4. Patient to demonstrate the ability to bilateral heel raise with control                 Patient goals: To have less pain    Pt. Education:  [x] Yes  [] No  [x] Reviewed Prior HEP/Ed  Method of Education: [] Verbal  [] Demo  []

## 2024-11-11 ENCOUNTER — HOSPITAL ENCOUNTER (OUTPATIENT)
Dept: PHYSICAL THERAPY | Facility: CLINIC | Age: 54
Setting detail: THERAPIES SERIES
Discharge: HOME | End: 2024-11-11
Attending: PODIATRIST
Payer: COMMERCIAL

## 2024-11-11 ENCOUNTER — OFFICE VISIT (OUTPATIENT)
Dept: PODIATRY | Age: 54
End: 2024-11-11

## 2024-11-11 VITALS — WEIGHT: 193 LBS | HEIGHT: 67 IN | BODY MASS INDEX: 30.29 KG/M2

## 2024-11-11 DIAGNOSIS — Z98.890 POST-OPERATIVE STATE: ICD-10-CM

## 2024-11-11 DIAGNOSIS — M72.2 PLANTAR FASCIITIS OF LEFT FOOT: Primary | ICD-10-CM

## 2024-11-11 PROCEDURE — 97110 THERAPEUTIC EXERCISES: CPT

## 2024-11-11 PROCEDURE — 97140 MANUAL THERAPY 1/> REGIONS: CPT

## 2024-11-11 PROCEDURE — 99024 POSTOP FOLLOW-UP VISIT: CPT | Performed by: PODIATRIST

## 2024-11-11 NOTE — FLOWSHEET NOTE
posterior tib work, progress hip strengthening,    Access Code: [email or web address]  URL: [email or web address]  Date: 11/04/2024  Prepared by: Nancy Long    Exercises  - Lift Splay Reach  - 2 x daily - 7 x weekly - 3 sets - 10 reps - 3sec hold  - Toe Yoga  - 2 x daily - 7 x weekly - 3 sets - 10 reps  - Seated Arch Lifts  - 2 x daily - 7 x weekly - 2 sets - 10 reps - 5sec hold  - Band Toe Press  - 2 x daily - 7 x weekly - 3 sets - 10 reps - 5sec hold  - Calf Stretch With Towel Under Foot  - 2-3 x daily - 7 x weekly - 1 sets - 10 reps - 60-90sec hold    Treatment Charges: Mins Units Time In/Out   []  Modalities        [x]  Ther Exercise 35 2    []  Neuromuscular Re-ed      []  Gait Training      [x]  Manual Therapy 15 1    []  Ther Activities      []  Aquatics        Vasocompression      []  Cervical Traction      []  Other      Total Billable time 50 3         Assessment: [x] Progressing toward goals.    [] No change.     [x] Other:Patient able to complete strengthening exercises with VC's to correct alignment and improve DF without midfoot compensation. Patient given Migue lean in place of TB toe press  Goals  MET NOT MET ON-  GOING  Details               STG: To be met in 10 treatments            1. ? Pain: Decrease pain levels to 4/10 max pain with walking []  []  []      2. ? ROM: Patient to demonstrate -8 degrees L TCJ DF []  []  []      3. ? Strength: Patient to demonstrate the ability to ambulate without limping []  []  []      4. Independent with Home Exercise Programs []  []  []        []  []  []        []  []  []                  LTG: To be met in 20 treatments           1. Patient to report 0/10 L foot pain with standing and walking []  []  []      2. Patient to demonstrate 0 degrees L TCJ DF []  []  []      3. Patient to report 70/80 on FAAM []  []  []      4. Patient to demonstrate the ability to bilateral heel raise with control                 Patient goals: To have less pain    Pt. Education:

## 2024-11-13 ENCOUNTER — HOSPITAL ENCOUNTER (OUTPATIENT)
Dept: PHYSICAL THERAPY | Facility: CLINIC | Age: 54
Setting detail: THERAPIES SERIES
Discharge: HOME | End: 2024-11-13
Attending: PODIATRIST
Payer: COMMERCIAL

## 2024-11-13 PROCEDURE — 97016 VASOPNEUMATIC DEVICE THERAPY: CPT

## 2024-11-13 PROCEDURE — 97110 THERAPEUTIC EXERCISES: CPT

## 2024-11-13 NOTE — FLOWSHEET NOTE
[] [facility]  Outpatient Rehabilitation &  Therapy  [address]  [phone]  [phone] [] [facility]  Outpatient Rehabilitation &  Therapy  [address]  P: [phone]  F: [phone] [x] [facility]  Outpatient Rehabilitation &  Therapy  [address]  P: [phone]  F: [phone] [] [facility]  Outpatient Rehabilitation &  Therapy  [address]  [phone]  [phone] [] [facility]  Outpatient Rehabilitation &  Therapy  [address]   P: [phone]  F: [phone]  [] [facility]  Outpatient Rehabilitation &  Therapy  [address]  P: [phone]  F: [phone] [] [facility]  Outpatient Rehabilitation &  Therapy  [address]  P: [phone]  F: [phone] [] [facility]  Outpatient Rehabilitation &  Therapy  [address]  P: [phone]  F: [phone] [] [facility]  Outpatient Rehabilitation &  Therapy  [address]  P: [phone]  F: [phone]  [] [facility] Outpatient Rehabilitation &  Therapy  [address]  P: [phone]  F: [phone]     Physical Therapy Daily Treatment Note    Date:  2024  Patient Name:  Tessa Ball    :  1970  MRN: [id number]  Physician: Fercho Villafana DPM                                                Insurance: [employer]--  29 visits remaining no auth required  CPT codes verified:  55276, 03661, 98189, 12949, 49520, 59286, 20351  Excluded: DRY NEEDLING - ,   Medical Diagnosis: Z98.890 (ICD-10-CM) - Post-operative state  M72.2 (ICD-10-CM) - Plantar fasciitis of left foot  M79.672 (ICD-10-CM) - Left foot pain                          Rehab Codes: R26.2 difficulty walking, M62.81 muscle weakness, M25.572  Onset date: 24

## 2024-11-14 NOTE — PROGRESS NOTES
[facility] PODIATRY  [address] [place], [place] [address]  Dept: [phone]    POST-OP PROGRESS NOTE  Date of patient's visit: 11/11/2024  Patient's Name:  Tessa Ball YOB: 1970            Patient Care Team:  Jermaine Jensen MD as PCP - General (Family Medicine)  Fercho Villafana DPM as Consulting Physician (Podiatry, Foot & Ankle Surgery)        Chief Complaint   Patient presents with    Post-Op Check       Subjective: Tessa Ball is a 54 y.o. female who presents to the office today 8week(s)  S/P plantar fasciotomy with Topaz ablation and PRP injection for correction of   Problem List Items Addressed This Visit    None  . Patient relates pain is present and improved.  Pain is rated 1 out of 10 and is described as mild. Currently denies F/C/N/V.  Is patient taking pain medications as prescribed and is controlling pain  Patient is doing physical therapy and states that is immensely helping her pain.  Patient states that she would like to continue a little bit of therapy therapy before returning back fully to work    Physical Examination:  Incision is coapted, sutures/steri-strips are intact. Minimal bleeding post operatively. Edema present. No erythema. No Pus.   Operative correction is satisfactory.    Assessment: Tessa Ball is status post as abovce  Normal post operative course.  Doing well       Diagnosis Orders   1. Plantar fasciitis of left foot        2. Post-operative state              Plan:  Patient examined and evaluated.  Current condition and treatment options discussed in detail.  Advised pt to her condition.  Patient is to weight-bear as tolerated in normal shoes and has no restrictions from me.  Patient will continue physical therapy for the next 4 weeks to go decrease the tension to that plantar fascia origin at the medial aspect of the calcaneal tuberosity.  Verbal and written instructions given to

## 2024-11-18 ENCOUNTER — HOSPITAL ENCOUNTER (OUTPATIENT)
Dept: PHYSICAL THERAPY | Facility: CLINIC | Age: 54
Setting detail: THERAPIES SERIES
Discharge: HOME | End: 2024-11-18
Attending: PODIATRIST
Payer: COMMERCIAL

## 2024-11-18 PROCEDURE — 97110 THERAPEUTIC EXERCISES: CPT

## 2024-11-18 PROCEDURE — 97140 MANUAL THERAPY 1/> REGIONS: CPT

## 2024-11-18 NOTE — FLOWSHEET NOTE
[] [facility]  Outpatient Rehabilitation &  Therapy  [address]  [phone]  [phone] [] [facility]  Outpatient Rehabilitation &  Therapy  [address]  P: [phone]  F: [phone] [x] [facility]  Outpatient Rehabilitation &  Therapy  [address]  P: [phone]  F: [phone] [] [facility]  Outpatient Rehabilitation &  Therapy  [address]  [phone]  [phone] [] [facility]  Outpatient Rehabilitation &  Therapy  [address]   P: [phone]  F: [phone]  [] [facility]  Outpatient Rehabilitation &  Therapy  [address]  P: [phone]  F: [phone] [] [facility]  Outpatient Rehabilitation &  Therapy  [address]  P: [phone]  F: [phone] [] [facility]  Outpatient Rehabilitation &  Therapy  [address]  P: [phone]  F: [phone] [] [facility]  Outpatient Rehabilitation &  Therapy  [address]  P: [phone]  F: [phone]  [] [facility] Outpatient Rehabilitation &  Therapy  [address]  P: [phone]  F: [phone]     Physical Therapy Daily Treatment Note    Date:  2024  Patient Name:  Tessa Ball    :  1970  MRN: [id number]  Physician: Fercho Villafana DPM                                                Insurance: MMO--  29 visits remaining no auth required  CPT codes verified:  25032, 30804, 51708, 05302, 51482, 63529, 90295  Excluded: DRY NEEDLING - ,   Medical Diagnosis: Z98.890 (ICD-10-CM) - Post-operative state  M72.2 (ICD-10-CM) - Plantar fasciitis of left foot  M79.672 (ICD-10-CM) - Left foot pain                          Rehab Codes: R26.2 difficulty walking, M62.81 muscle weakness, M25.572  Onset date: 24

## 2024-11-20 ENCOUNTER — HOSPITAL ENCOUNTER (OUTPATIENT)
Dept: PHYSICAL THERAPY | Facility: CLINIC | Age: 54
Setting detail: THERAPIES SERIES
Discharge: HOME | End: 2024-11-20
Attending: PODIATRIST
Payer: COMMERCIAL

## 2024-11-20 PROCEDURE — 97140 MANUAL THERAPY 1/> REGIONS: CPT

## 2024-11-20 PROCEDURE — 97110 THERAPEUTIC EXERCISES: CPT

## 2024-11-20 NOTE — FLOWSHEET NOTE
[] [facility]  Outpatient Rehabilitation &  Therapy  [address]  [phone]  [phone] [] [facility]  Outpatient Rehabilitation &  Therapy  [address]  P: [phone]  F: [phone] [x] [facility]  Outpatient Rehabilitation &  Therapy  [address]  P: [phone]  F: [phone] [] [facility]  Outpatient Rehabilitation &  Therapy  [address]  [phone]  [phone] [] [facility]  Outpatient Rehabilitation &  Therapy  [address]   P: [phone]  F: [phone]  [] [facility]  Outpatient Rehabilitation &  Therapy  [address]  P: [phone]  F: [phone] [] [facility]  Outpatient Rehabilitation &  Therapy  [address]  P: [phone]  F: [phone] [] [facility]  Outpatient Rehabilitation &  Therapy  [address]  P: [phone]  F: [phone] [] [facility]  Outpatient Rehabilitation &  Therapy  [address]  P: [phone]  F: [phone]  [] [facility] Outpatient Rehabilitation &  Therapy  [address]  P: [phone]  F: [phone]     Physical Therapy Daily Treatment Note    Date:  2024  Patient Name:  Tessa Ball    :  1970  MRN: [id number]  Physician: Fercho Villafana DPM                                                Insurance: MMO--  29 visits remaining no auth required  CPT codes verified:  49039, 82488, 01015, 02024, 00575, 27623, 67915  Excluded: DRY NEEDLING - ,   Medical Diagnosis: Z98.890 (ICD-10-CM) - Post-operative state  M72.2 (ICD-10-CM) - Plantar fasciitis of left foot  M79.672 (ICD-10-CM) - Left foot pain                          Rehab Codes: R26.2 difficulty walking, M62.81 muscle weakness, M25.572  Onset date: 24

## 2024-12-02 ENCOUNTER — HOSPITAL ENCOUNTER (OUTPATIENT)
Dept: PHYSICAL THERAPY | Facility: CLINIC | Age: 54
Setting detail: THERAPIES SERIES
Discharge: HOME | End: 2024-12-02
Attending: PODIATRIST
Payer: COMMERCIAL

## 2024-12-02 PROCEDURE — 97140 MANUAL THERAPY 1/> REGIONS: CPT

## 2024-12-02 PROCEDURE — 97110 THERAPEUTIC EXERCISES: CPT

## 2024-12-02 NOTE — PROGRESS NOTES
Exercise   67192  [] Iontophoresis: 4 mg/mL Dexamethasone Sodium Phosphate  mAmin  35995   [x] Therapeutic Activity  25801 [x] Vasopneumatic cold with compression  06638    [x] Gait Training   17373 [] Ultrasound   12049   [x] Neuromuscular Re-education  44085 [] Electrical Stimulation Unattended  72117   [x] Manual Therapy  95598 [] Electrical Stimulation Attended  59626   [x] Instruction in HEP  [] Lumbar/Cervical Traction  65536                   Patient Status:     [x] Continue per initial plan of care. Good response to new strengthening exercises to improve foot and ankle stability with WB. Added core stabilization with tripod foot position VC's to maintain toes on floor    [] Additional visits necessary.    [] Other:       Electronically signed by Nancy Long PT on 12/2/2024 at 3:53 PM      If you have any questions or concerns, please don't hesitate to call.  Thank you for your referral.    Physician Signature:________________________________Date:__________________  By signing above or cosigning this note, I have reviewed this plan of care and certify a need for medically necessary rehabilitation services.     *PLEASE SIGN ABOVE AND FAX BACK ALL PAGES*

## 2024-12-04 ENCOUNTER — HOSPITAL ENCOUNTER (OUTPATIENT)
Dept: PHYSICAL THERAPY | Facility: CLINIC | Age: 54
Setting detail: THERAPIES SERIES
Discharge: HOME | End: 2024-12-04
Attending: PODIATRIST
Payer: COMMERCIAL

## 2024-12-04 PROCEDURE — 97110 THERAPEUTIC EXERCISES: CPT

## 2024-12-04 PROCEDURE — 97016 VASOPNEUMATIC DEVICE THERAPY: CPT

## 2024-12-04 PROCEDURE — 97140 MANUAL THERAPY 1/> REGIONS: CPT

## 2024-12-04 NOTE — FLOWSHEET NOTE
[] [facility]  Outpatient Rehabilitation &  Therapy  [address]  [phone]  [phone] [] [facility]  Outpatient Rehabilitation &  Therapy  [address]  P: [phone]  F: [phone] [x] [facility]  Outpatient Rehabilitation &  Therapy  [address]  P: [phone]  F: [phone] [] [facility]  Outpatient Rehabilitation &  Therapy  [address]  [phone]  [phone] [] [facility]  Outpatient Rehabilitation &  Therapy  [address]   P: [phone]  F: [phone]  [] [facility]  Outpatient Rehabilitation &  Therapy  [address]  P: [phone]  F: [phone] [] [facility]  Outpatient Rehabilitation &  Therapy  [address]  P: [phone]  F: [phone] [] [facility]  Outpatient Rehabilitation &  Therapy  [address]  P: [phone]  F: [phone] [] [facility]  Outpatient Rehabilitation &  Therapy  [address]  P: [phone]  F: [phone]  [] [facility] Outpatient Rehabilitation &  Therapy  [address]  P: [phone]  F: [phone]     Physical Therapy Daily Treatment Note    Date:  2024  Patient Name:  Tessa Ball    :  1970  MRN: [id number]  Physician: Fercho Villafana DPM                                                Insurance: MMO--  29 visits remaining no auth required  CPT codes verified:  76032, 24467, 95811, 65464, 82442, 52759, 15330 Excluded: DRY NEEDLING - ,   Medical Diagnosis: Z98.890 (ICD-10-CM) - Post-operative state  M72.2 (ICD-10-CM) - Plantar fasciitis of left foot  M79.672 (ICD-10-CM) - Left foot pain                          Rehab Codes: R26.2 difficulty walking, M62.81 muscle weakness, M25.572  Onset date: 24

## 2024-12-09 ENCOUNTER — HOSPITAL ENCOUNTER (OUTPATIENT)
Dept: PHYSICAL THERAPY | Facility: CLINIC | Age: 54
Setting detail: THERAPIES SERIES
Discharge: HOME | End: 2024-12-09
Attending: PODIATRIST
Payer: COMMERCIAL

## 2024-12-09 ENCOUNTER — OFFICE VISIT (OUTPATIENT)
Dept: PODIATRY | Age: 54
End: 2024-12-09

## 2024-12-09 VITALS — BODY MASS INDEX: 30.29 KG/M2 | HEIGHT: 67 IN | WEIGHT: 193 LBS

## 2024-12-09 DIAGNOSIS — Z98.890 POST-OPERATIVE STATE: ICD-10-CM

## 2024-12-09 DIAGNOSIS — M72.2 PLANTAR FASCIITIS OF LEFT FOOT: ICD-10-CM

## 2024-12-09 DIAGNOSIS — M79.672 LEFT FOOT PAIN: Primary | ICD-10-CM

## 2024-12-09 PROCEDURE — 97140 MANUAL THERAPY 1/> REGIONS: CPT

## 2024-12-09 PROCEDURE — 99024 POSTOP FOLLOW-UP VISIT: CPT | Performed by: PODIATRIST

## 2024-12-09 PROCEDURE — 97110 THERAPEUTIC EXERCISES: CPT

## 2024-12-09 NOTE — FLOWSHEET NOTE
[] [facility]  Outpatient Rehabilitation &  Therapy  [address]  [phone]  [phone] [] [facility]  Outpatient Rehabilitation &  Therapy  [address]  P: [phone]  F: [phone] [x] [facility]  Outpatient Rehabilitation &  Therapy  [address]  P: [phone]  F: [phone] [] [facility]  Outpatient Rehabilitation &  Therapy  [address]  [phone]  [phone] [] [facility]  Outpatient Rehabilitation &  Therapy  [address]   P: [phone]  F: [phone]  [] [facility]  Outpatient Rehabilitation &  Therapy  [address]  P: [phone]  F: [phone] [] [facility]  Outpatient Rehabilitation &  Therapy  [address]  P: [phone]  F: [phone] [] [facility]  Outpatient Rehabilitation &  Therapy  [address]  P: [phone]  F: [phone] [] [facility]  Outpatient Rehabilitation &  Therapy  [address]  P: [phone]  F: [phone]  [] [facility] Outpatient Rehabilitation &  Therapy  [address]  P: [phone]  F: [phone]     Physical Therapy Daily Treatment Note    Date:  2024  Patient Name:  Tessa Ball    :  1970  MRN: [id number]  Physician: Fercho Villafana DPM                                                Insurance: MMO--  29 visits remaining no auth required  CPT codes verified:  23204, 33918, 99553, 95361, 01558, 64917, 63623 Excluded: DRY NEEDLING - ,   Medical Diagnosis: Z98.890 (ICD-10-CM) - Post-operative state  M72.2 (ICD-10-CM) - Plantar fasciitis of left foot  M79.672 (ICD-10-CM) - Left foot pain                          Rehab Codes: R26.2 difficulty walking, M62.81 muscle weakness, M25.572  Onset date: 24

## 2024-12-16 ENCOUNTER — HOSPITAL ENCOUNTER (OUTPATIENT)
Dept: PHYSICAL THERAPY | Facility: CLINIC | Age: 54
Setting detail: THERAPIES SERIES
Discharge: HOME | End: 2024-12-16
Attending: PODIATRIST
Payer: COMMERCIAL

## 2024-12-16 PROCEDURE — 97110 THERAPEUTIC EXERCISES: CPT

## 2024-12-16 PROCEDURE — 97140 MANUAL THERAPY 1/> REGIONS: CPT

## 2024-12-16 NOTE — PROGRESS NOTES
[facility] PODIATRY  [address] [place], [place] [address]  Dept: [phone]    POST-OP PROGRESS NOTE  Date of patient's visit: 11/11/2024  Patient's Name:  Tessa Ball YOB: 1970            Patient Care Team:  Jermaine Jensen MD as PCP - General (Family Medicine)  Fercho Villafana DPM as Consulting Physician (Podiatry, Foot & Ankle Surgery)        Chief Complaint   Patient presents with    Post-Op Check       Subjective: Tessa Ball is a 54 y.o. female who presents to the office today 8week(s)  S/P plantar fasciotomy with Topaz ablation and PRP injection for correction of   Problem List Items Addressed This Visit       Left foot pain - Primary    Relevant Orders    Ambulatory referral to Physical Therapy     Other Visit Diagnoses       Plantar fasciitis of left foot        Relevant Orders    Ambulatory referral to Physical Therapy    Post-operative state        Relevant Orders    Ambulatory referral to Physical Therapy        . Patient relates pain is present and improved.  Pain is rated 1 out of 10 and is described as mild. Currently denies F/C/N/V.  Is patient taking pain medications as prescribed and is controlling pain  Patient is doing physical therapy and states that is immensely helping her pain.  Patient states that she would like to continue a little bit of therapy therapy before returning back fully to work    Physical Examination:  Incision is coapted, sutures/steri-strips are intact. Minimal bleeding post operatively. Edema present. No erythema. No Pus.   Operative correction is satisfactory.    Assessment: Tessa Ball is status post as abovce  Normal post operative course.  Doing well       Diagnosis Orders   1. Left foot pain  Ambulatory referral to Physical Therapy      2. Plantar fasciitis of left foot  Ambulatory referral to Physical Therapy      3. Post-operative state  Ambulatory referral to

## 2024-12-16 NOTE — FLOWSHEET NOTE
Next Dr's appt.: 1/13/25  Visit# / total visits: 15/20     Cancels/No Shows: 0/0      Subjective:    Pain:  [] Yes  [] No Location: Pain Rating: (0-10 scale) 2/10   Pain altered Tx:  [] No  [x] Yes  Action: see below  Comments:  Patient reports having 2/10 L ankle pain. She reports having anterior TCJ pain when completing steps      Objective:    Precautions [] No  [x] Yes:   Procedure: 9/17/24  Percutaneous Radiofrequency ablation (Topaz) and plantar fasciectomy of plantar fascia, L foot (CPT 53658)  Injectable biologic, platelet rich plasma, L foot and ankle (CPT 0232T)      [x] MRI: 7/12/24              IMPRESSION:  1. Severe plantar fasciitis with tearing at the origin of the central cord  plantar fascia and reactive marrow edema at the plantar calcaneus.  No  calcaneal stress fracture.  2. Split tearing and moderate tendinosis of peroneus brevis tendon.  Moderate  peroneal tenosynovitis.  3. Complete ATFL tear likely remote.  4. Mild degenerative changes as detailed above.  5. Small posterior subtalar effusion.  6. Mild edema in the subcutaneous fat about the left ankle.  No organized  fluid collection.    Modalities: vasocompression [id number] 34 degrees mod compression-HELD TODAY  Exercises:  Exercise Reps/ Time Weight/ Level Comments     Standing heel raises 20   W/ ball x   Seated heel raise 25 20lbs Up 2 dwn 2 x   KB swing 3x30\" 10lbs   x   MOBO rocks x25     x   Towel calf stretch 90\"     x   LAX rolling 90\"x2     x    towel stretch          Triplaner calf stretch 10ea   Gastroc and soleus x              KB DF 20 10bs  return to 10lbs    Peroneal B ISO 2x10 blue Brevis and longus    Migue lean 20   ADDED TO HEP    Fig 8 arch lift 20 blue standing x   moonwalk 10                   bridge 2x10     x   Wall hip hike 10        Clam shells 20ea  5lbs   x   Toe yoga standing                               short foot            banded calf stretch        x                              Manual: STR L

## 2024-12-31 ENCOUNTER — HOSPITAL ENCOUNTER (OUTPATIENT)
Dept: PHYSICAL THERAPY | Facility: CLINIC | Age: 54
Setting detail: THERAPIES SERIES
Discharge: HOME | End: 2024-12-31
Attending: PODIATRIST
Payer: COMMERCIAL

## 2024-12-31 PROCEDURE — 97110 THERAPEUTIC EXERCISES: CPT

## 2024-12-31 PROCEDURE — 97140 MANUAL THERAPY 1/> REGIONS: CPT

## 2024-12-31 NOTE — FLOWSHEET NOTE
[] [facility]  Outpatient Rehabilitation &  Therapy  [address]  [phone]  [phone] [] [facility]  Outpatient Rehabilitation &  Therapy  [address]  P: [phone]  F: [phone] [x] [facility]  Outpatient Rehabilitation &  Therapy  [address]  P: [phone]  F: [phone] [] [facility]  Outpatient Rehabilitation &  Therapy  [address]  [phone]  [phone] [] [facility]  Outpatient Rehabilitation &  Therapy  [address]   P: [phone]  F: [phone]  [] [facility]  Outpatient Rehabilitation &  Therapy  [address]  P: [phone]  F: [phone] [] [facility]  Outpatient Rehabilitation &  Therapy  [address]  P: [phone]  F: [phone] [] [facility]  Outpatient Rehabilitation &  Therapy  [address]  P: [phone]  F: [phone] [] [facility]  Outpatient Rehabilitation &  Therapy  [address]  P: [phone]  F: [phone]  [] [facility] Outpatient Rehabilitation &  Therapy  [address]  P: [phone]  F: [phone]     Physical Therapy Daily Treatment Note    Date:  2024  Patient Name:  Tessa Ball    :  1970  MRN: [id number]  Physician: Fercho Villafana DPM                                                Insurance: MMO--  29 visits remaining no auth required  CPT codes verified:  81362, 24313, 98299, 31382, 94950, 49791, 48843 Excluded: DRY NEEDLING - ,   Medical Diagnosis: Z98.890 (ICD-10-CM) - Post-operative state  M72.2 (ICD-10-CM) - Plantar fasciitis of left foot  M79.672 (ICD-10-CM) - Left foot pain                          Rehab Codes: R26.2 difficulty walking, M62.81 muscle weakness, M25.572  Onset date: 24

## 2025-01-13 ENCOUNTER — OFFICE VISIT (OUTPATIENT)
Dept: PODIATRY | Age: 55
End: 2025-01-13

## 2025-01-13 VITALS — BODY MASS INDEX: 30.29 KG/M2 | HEIGHT: 67 IN | WEIGHT: 193 LBS

## 2025-01-13 DIAGNOSIS — Z98.890 POST-OPERATIVE STATE: Primary | ICD-10-CM

## 2025-01-13 DIAGNOSIS — M72.2 PLANTAR FASCIITIS OF LEFT FOOT: ICD-10-CM

## 2025-01-13 PROCEDURE — 99024 POSTOP FOLLOW-UP VISIT: CPT | Performed by: PODIATRIST

## 2025-01-20 NOTE — PROGRESS NOTES
[facility] PODIATRY  [address] [place], [place] [address]  Dept: [phone]    POST-OP PROGRESS NOTE  Date of patient's visit: 1/13/2025  Patient's Name:  Tessa Ball YOB: 1970            Patient Care Team:  Jermaine Jensen MD as PCP - General (Family Medicine)  Fercho Villafana DPM as Consulting Physician (Podiatry, Foot & Ankle Surgery)        Chief Complaint   Patient presents with    Foot Pain     Left foot       Subjective: Tessa Ball is a 54 y.o. female who presents to the office today 8week(s)  S/P plantar fasciotomy with Topaz ablation and PRP injection for correction of   Problem List Items Addressed This Visit    None    . Patient relates pain is present and improved.  Pain is rated 1 out of 10 and is described as mild. Currently denies F/C/N/V.  Is patient taking pain medications as prescribed and is controlling pain  Patient is doing physical therapy and states that is immensely helping her pain.  Patient states that she would like to continue a little bit of therapy therapy before returning back fully to work    Physical Examination:  Incision is coapted, sutures/steri-strips are intact. Minimal bleeding post operatively. Edema present. No erythema. No Pus.   Operative correction is satisfactory.    Assessment: Tessa Ball is status post as abovce  Normal post operative course.  Doing well       Diagnosis Orders   1. Post-operative state        2. Plantar fasciitis of left foot                  Plan:  Patient examined and evaluated.  Current condition and treatment options discussed in detail.  Advised pt to her condition.  Patient is to weight-bear as tolerated in normal shoes and has no restrictions from me.  Patient will continue physical therapy for the next 4 weeks to go decrease the tension to that plantar fascia origin at the medial aspect of the calcaneal tuberosity.  Verbal and written

## 2025-02-13 ENCOUNTER — CLINICAL DOCUMENTATION (OUTPATIENT)
Dept: PHYSICAL THERAPY | Facility: CLINIC | Age: 55
End: 2025-02-13

## 2025-02-13 NOTE — THERAPY DISCHARGE
Next Dr's appt.: 1/13/25  Visit# / total visits: 16/20                                Cancels/No Shows: 0/0  Date of initial visit: 10/25/24                Date of final visit: 12/31/24        Treatment to Date:  [x] Therapeutic Exercise    [] Modalities:  [x] Therapeutic Activity    [] Ultrasound  [] Electrical Stimulation  [x] Gait Training     [] Massage       [] Lumbar/Cervical Traction  [x] Neuromuscular Re-education [] Cold/hotpack [] Iontophoresis: 4 mg/mL  [x] Instruction in Home Exercise Program                     Dexamethasone Sodium  [x] Manual Therapy             Phosphate 40-80 mAmin  [] Aquatic Therapy                   [x] Vasocompression/    [] Other:             Game Ready    Discharge Status:     [] Pt recovered from conditions. Treatment goals were met.    [] Pt received maximum benefit. No further therapy indicated at this time.    [] Pt to continue exercise/home instructions independently.    [] Therapy interrupted due to:    [] Pt has 2 or more no shows/cancels, is discontinued per our policy.    [] Pt has completed prescribed number of treatment sessions.    [x] Other: Patient did not return after follow up with physician         Electronically signed by Nancy Long PT on 2/13/2025 at 10:20 AM      If you have any questions or concerns, please don't hesitate to call.  Thank you for your referral.

## 2025-05-12 ENCOUNTER — OFFICE VISIT (OUTPATIENT)
Dept: PODIATRY | Age: 55
End: 2025-05-12
Payer: COMMERCIAL

## 2025-05-12 VITALS — WEIGHT: 195 LBS | BODY MASS INDEX: 30.61 KG/M2 | HEIGHT: 67 IN

## 2025-05-12 DIAGNOSIS — M79.672 LEFT FOOT PAIN: ICD-10-CM

## 2025-05-12 DIAGNOSIS — M84.375A STRESS FRACTURE OF LEFT CALCANEUS: ICD-10-CM

## 2025-05-12 DIAGNOSIS — M72.2 PLANTAR FASCIITIS OF LEFT FOOT: Primary | ICD-10-CM

## 2025-05-12 PROCEDURE — 1036F TOBACCO NON-USER: CPT | Performed by: PODIATRIST

## 2025-05-12 PROCEDURE — 3017F COLORECTAL CA SCREEN DOC REV: CPT | Performed by: PODIATRIST

## 2025-05-12 PROCEDURE — G8417 CALC BMI ABV UP PARAM F/U: HCPCS | Performed by: PODIATRIST

## 2025-05-12 PROCEDURE — L3020 FOOT LONGITUD/METATARSAL SUP: HCPCS | Performed by: PODIATRIST

## 2025-05-12 PROCEDURE — 99213 OFFICE O/P EST LOW 20 MIN: CPT | Performed by: PODIATRIST

## 2025-05-12 PROCEDURE — G8427 DOCREV CUR MEDS BY ELIG CLIN: HCPCS | Performed by: PODIATRIST

## 2025-05-12 RX ORDER — IBUPROFEN 600 MG/1
TABLET, FILM COATED ORAL
COMMUNITY
Start: 2025-04-28

## 2025-05-12 RX ORDER — BENZONATATE 200 MG/1
200 CAPSULE ORAL 3 TIMES DAILY PRN
COMMUNITY
Start: 2025-02-01

## 2025-05-12 NOTE — PROGRESS NOTES
[facility] PHYSICIANS [facility] PODIATRY  [address] [place], [place] [address]  Dept: [phone]    RETURN PATIENT PROGRESS NOTE  Date of patient's visit: 5/12/2025  Patient's Name:  Tessa Ball YOB: 1970            Patient Care Team:  Jermaine Jensen MD as PCP - General (Family Medicine)  Fercho Villafana DPM as Consulting Physician (Podiatry, Foot & Ankle Surgery)       Tessa Ball 55 y.o. female that presents for follow-up of   Chief Complaint   Patient presents with    Foot Pain     Left foot     Pt's primary care physician is Jermaine Jensen MD last seen may 13 2024  Symptoms began 8 month(s) ago and are decreased .  Patient relates pain is Absent .  Pain is rated 0 out of 10 and is described as none.  Treatments prior to today's visit include: previous podiatry treatment, surgical procedure done on September 17 2024.  Currently denies F/C/N/V.     Allergies   Allergen Reactions    Minocycline      Other Reaction(s): bruising    Sulfa Antibiotics Hives       Past Medical History:   Diagnosis Date    Asthma     Hyperlipidemia     Hypertension     Mitral valve prolapse        Prior to Admission medications    Medication Sig Start Date End Date Taking? Authorizing Provider   ibuprofen (ADVIL;MOTRIN) 600 MG tablet TAKE 1 TABLET BY MOUTH EVERY 6 TO 8 HOURS AS NEEDED 4/28/25  Yes Chandu Denis MD   benzonatate (TESSALON) 200 MG capsule Take 1 capsule by mouth 3 times daily as needed 2/1/25  Yes Chandu Denis MD   Multiple Vitamin (MVI, CELEBRATE, CHEWABLE TABLET) Take 1 tablet by mouth daily   Yes Chandu Denis MD   Misc. Devices MISC Please dispense 1 knee walker/scooter 7/29/24  Yes Fercho Villafana DPM   albuterol sulfate HFA (PROVENTIL;VENTOLIN;PROAIR) 108 (90 Base) MCG/ACT inhaler Inhale 2 puffs into the lungs every 6 hours   Yes Chandu Denis MD   amLODIPine-benazepril (LOTREL) 5-20 MG per capsule

## 2025-05-19 PROCEDURE — L3020 FOOT LONGITUD/METATARSAL SUP: HCPCS | Performed by: PODIATRIST

## 2025-08-21 ENCOUNTER — OFFICE VISIT (OUTPATIENT)
Age: 55
End: 2025-08-21
Payer: COMMERCIAL

## 2025-08-21 VITALS
TEMPERATURE: 98.2 F | BODY MASS INDEX: 29.87 KG/M2 | WEIGHT: 190.3 LBS | HEIGHT: 67 IN | HEART RATE: 87 BPM | SYSTOLIC BLOOD PRESSURE: 122 MMHG | DIASTOLIC BLOOD PRESSURE: 83 MMHG | OXYGEN SATURATION: 98 %

## 2025-08-21 DIAGNOSIS — R10.11 RUQ PAIN: ICD-10-CM

## 2025-08-21 DIAGNOSIS — K82.8 GALLBLADDER SLUDGE: ICD-10-CM

## 2025-08-21 DIAGNOSIS — K81.1 CHRONIC CHOLECYSTITIS: Primary | ICD-10-CM

## 2025-08-21 DIAGNOSIS — K21.9 CHRONIC GERD: ICD-10-CM

## 2025-08-21 PROCEDURE — 1036F TOBACCO NON-USER: CPT | Performed by: NURSE PRACTITIONER

## 2025-08-21 PROCEDURE — 3074F SYST BP LT 130 MM HG: CPT | Performed by: NURSE PRACTITIONER

## 2025-08-21 PROCEDURE — G8417 CALC BMI ABV UP PARAM F/U: HCPCS | Performed by: NURSE PRACTITIONER

## 2025-08-21 PROCEDURE — 3079F DIAST BP 80-89 MM HG: CPT | Performed by: NURSE PRACTITIONER

## 2025-08-21 PROCEDURE — G8427 DOCREV CUR MEDS BY ELIG CLIN: HCPCS | Performed by: NURSE PRACTITIONER

## 2025-08-21 PROCEDURE — 99203 OFFICE O/P NEW LOW 30 MIN: CPT | Performed by: NURSE PRACTITIONER

## 2025-08-21 PROCEDURE — 3017F COLORECTAL CA SCREEN DOC REV: CPT | Performed by: NURSE PRACTITIONER

## 2025-08-21 RX ORDER — SUCRALFATE 1 G/1
1 TABLET ORAL
COMMUNITY
Start: 2025-06-03 | End: 2026-06-03

## 2025-08-21 RX ORDER — FAMOTIDINE 20 MG/1
20 TABLET, FILM COATED ORAL 2 TIMES DAILY
COMMUNITY
Start: 2025-06-03 | End: 2026-06-03

## 2025-08-21 ASSESSMENT — ENCOUNTER SYMPTOMS
SHORTNESS OF BREATH: 0
RHINORRHEA: 0
COUGH: 0
SORE THROAT: 0

## 2025-08-23 PROBLEM — K81.1 CHRONIC CHOLECYSTITIS: Status: ACTIVE | Noted: 2025-08-23

## (undated) DEVICE — JELLY,LUBE,STERILE,FLIP TOP,TUBE,2-OZ: Brand: MEDLINE

## (undated) DEVICE — GLOVE ORANGE PI 7 1/2   MSG9075

## (undated) DEVICE — 60-7070-103 TRNQT,DPSB,PLC RED: Brand: MEDLINE RENEWAL

## (undated) DEVICE — APPLICATOR MEDICATED 10.5 CC SOLUTION HI LT ORNG CHLORAPREP

## (undated) DEVICE — DEFENDO AIR WATER SUCTION AND BIOPSY VALVE KIT FOR  OLYMPUS: Brand: DEFENDO AIR/WATER/SUCTION AND BIOPSY VALVE

## (undated) DEVICE — MHPB HAND AND FOOT PACK: Brand: MEDLINE INDUSTRIES, INC.

## (undated) DEVICE — GLOVE ORANGE PI 8   MSG9080

## (undated) DEVICE — PADDING,UNDERCAST,COTTON, 4"X4YD STERILE: Brand: MEDLINE

## (undated) DEVICE — SOLUTION IRRIG 1000ML 0.9% SOD CHL USP POUR PLAS BTL

## (undated) DEVICE — CONTAINER,SPECIMEN,4OZ,OR STRL: Brand: MEDLINE

## (undated) DEVICE — BITEBLOCK 54FR W/ DENT RIM BLOX

## (undated) DEVICE — TOPAZ EZ MICRODEBRIDER COBLATION WAND WITH INTEGRATED FINGER SWITCH IFS: Brand: COBLATION

## (undated) DEVICE — HYPODERMIC SAFETY NEEDLE: Brand: MAGELLAN

## (undated) DEVICE — GOWN,POLY REINFORCED,LG: Brand: MEDLINE

## (undated) DEVICE — SOLUTION IV 250ML 0.9% SOD CHL PH 5 INJ USP VIAFLX PLAS

## (undated) DEVICE — FORCEPS BX L240CM WRK CHN 2.8MM STD CAP W/ NDL MIC MESH

## (undated) DEVICE — DRESSING PETRO W3XL3IN OIL EMUL N ADH GZ KNIT IMPREG CELOS